# Patient Record
Sex: MALE | Race: WHITE | URBAN - METROPOLITAN AREA
[De-identification: names, ages, dates, MRNs, and addresses within clinical notes are randomized per-mention and may not be internally consistent; named-entity substitution may affect disease eponyms.]

---

## 2018-03-22 ENCOUNTER — HISTORICAL (OUTPATIENT)
Dept: ADMINISTRATIVE | Facility: HOSPITAL | Age: 38
End: 2018-03-22

## 2018-03-22 ENCOUNTER — HISTORICAL (OUTPATIENT)
Dept: LAB | Facility: HOSPITAL | Age: 38
End: 2018-03-22

## 2018-03-22 LAB
ALBUMIN SERPL-MCNC: 4.8 GM/DL (ref 3.4–5)
ALBUMIN/GLOB SERPL: 1.71 {RATIO} (ref 1.5–2.5)
ALP SERPL-CCNC: 46 UNIT/L (ref 38–126)
ALT SERPL-CCNC: 91 UNIT/L (ref 7–52)
AST SERPL-CCNC: 53 UNIT/L (ref 15–37)
BILIRUB SERPL-MCNC: 0.9 MG/DL (ref 0.2–1)
BILIRUBIN DIRECT+TOT PNL SERPL-MCNC: 0.2 MG/DL (ref 0–0.5)
BUN SERPL-MCNC: 12 MG/DL (ref 7–18)
CALCIUM SERPL-MCNC: 9.8 MG/DL (ref 8.5–10)
CHLORIDE SERPL-SCNC: 99 MMOL/L (ref 98–107)
CHOLEST SERPL-MCNC: 189 MG/DL (ref 0–200)
CHOLEST/HDLC SERPL: 6.1 {RATIO}
CO2 SERPL-SCNC: 30 MMOL/L (ref 21–32)
CREAT SERPL-MCNC: 0.8 MG/DL (ref 0.6–1.3)
CREAT/UREA NIT SERPL: 15
GGT SERPL-CCNC: 34 UNIT/L (ref 5–85)
GLOBULIN SER-MCNC: 2.8 GM/DL (ref 1.2–3)
GLUCOSE SERPL-MCNC: 87 MG/DL (ref 74–106)
HAV IGM SERPL QL IA: NEGATIVE
HBV CORE IGM SERPL QL IA: NEGATIVE
HBV SURFACE AG SERPL QL IA: NEGATIVE
HCV AB SERPL QL IA: NEGATIVE
HDLC SERPL-MCNC: 31 MG/DL (ref 35–60)
HEPATITIS PANEL INTERP: NORMAL
LDH SERPL-CCNC: 166 UNIT/L (ref 140–271)
LDLC SERPL CALC-MCNC: 139 MG/DL (ref 0–129)
POTASSIUM SERPL-SCNC: 4.5 MMOL/L (ref 3.5–5.1)
PROT SERPL-MCNC: 7.6 GM/DL (ref 6.4–8.2)
SODIUM SERPL-SCNC: 137 MMOL/L (ref 136–145)
TRIGL SERPL-MCNC: 143 MG/DL (ref 30–150)
VLDLC SERPL CALC-MCNC: 28.6 MG/DL

## 2018-07-31 ENCOUNTER — HISTORICAL (OUTPATIENT)
Dept: ADMINISTRATIVE | Facility: HOSPITAL | Age: 38
End: 2018-07-31

## 2018-09-21 ENCOUNTER — HISTORICAL (OUTPATIENT)
Dept: ADMINISTRATIVE | Facility: HOSPITAL | Age: 38
End: 2018-09-21

## 2018-09-21 LAB
ALBUMIN SERPL-MCNC: 4.6 GM/DL (ref 3.4–5)
ALBUMIN/GLOB SERPL: 1.53 {RATIO} (ref 1.5–2.5)
ALP SERPL-CCNC: 48 UNIT/L (ref 38–126)
ALT SERPL-CCNC: 30 UNIT/L (ref 7–52)
AST SERPL-CCNC: 30 UNIT/L (ref 15–37)
BILIRUB SERPL-MCNC: 0.8 MG/DL (ref 0.2–1)
BILIRUBIN DIRECT+TOT PNL SERPL-MCNC: 0.2 MG/DL (ref 0–0.5)
BILIRUBIN DIRECT+TOT PNL SERPL-MCNC: 0.6 MG/DL
BUN SERPL-MCNC: 10 MG/DL (ref 7–18)
CALCIUM SERPL-MCNC: 9.3 MG/DL (ref 8.5–10)
CHLORIDE SERPL-SCNC: 101 MMOL/L (ref 98–107)
CO2 SERPL-SCNC: 28 MMOL/L (ref 21–32)
CREAT SERPL-MCNC: 0.85 MG/DL (ref 0.6–1.3)
GLOBULIN SER-MCNC: 3 GM/DL (ref 1.2–3)
GLUCOSE SERPL-MCNC: 98 MG/DL (ref 74–106)
POTASSIUM SERPL-SCNC: 3.9 MMOL/L (ref 3.5–5.1)
PROT SERPL-MCNC: 7.6 GM/DL (ref 6.4–8.2)
SODIUM SERPL-SCNC: 136 MMOL/L (ref 136–145)

## 2019-04-08 ENCOUNTER — HISTORICAL (OUTPATIENT)
Dept: ADMINISTRATIVE | Facility: HOSPITAL | Age: 39
End: 2019-04-08

## 2019-04-08 LAB
ABS NEUT (OLG): 3.9 X10(3)/MCL (ref 2.1–9.2)
ALBUMIN SERPL-MCNC: 4.7 GM/DL (ref 3.4–5)
ALBUMIN/GLOB SERPL: 2.35 {RATIO} (ref 1.5–2.5)
ALP SERPL-CCNC: 38 UNIT/L (ref 38–126)
ALT SERPL-CCNC: 34 UNIT/L (ref 7–52)
AST SERPL-CCNC: 23 UNIT/L (ref 15–37)
BILIRUB SERPL-MCNC: 0.5 MG/DL (ref 0.2–1)
BILIRUBIN DIRECT+TOT PNL SERPL-MCNC: 0.1 MG/DL (ref 0–0.5)
BILIRUBIN DIRECT+TOT PNL SERPL-MCNC: 0.4 MG/DL
BUN SERPL-MCNC: 12 MG/DL (ref 7–18)
CALCIUM SERPL-MCNC: 9.3 MG/DL (ref 8.5–10)
CHLORIDE SERPL-SCNC: 102 MMOL/L (ref 98–107)
CHOLEST SERPL-MCNC: 183 MG/DL (ref 0–200)
CHOLEST/HDLC SERPL: 5.1 {RATIO}
CO2 SERPL-SCNC: 28 MMOL/L (ref 21–32)
CREAT SERPL-MCNC: 0.88 MG/DL (ref 0.6–1.3)
ERYTHROCYTE [DISTWIDTH] IN BLOOD BY AUTOMATED COUNT: 12.7 % (ref 11.5–17)
GLOBULIN SER-MCNC: 2 GM/DL (ref 1.2–3)
GLUCOSE SERPL-MCNC: 97 MG/DL (ref 74–106)
HCT VFR BLD AUTO: 44.9 % (ref 42–52)
HDLC SERPL-MCNC: 36 MG/DL (ref 35–60)
HGB BLD-MCNC: 15.3 GM/DL (ref 14–18)
LDLC SERPL CALC-MCNC: 118 MG/DL (ref 0–129)
LYMPHOCYTES # BLD AUTO: 2.3 X10(3)/MCL (ref 0.6–3.4)
LYMPHOCYTES NFR BLD AUTO: 34.9 % (ref 13–40)
MCH RBC QN AUTO: 29.7 PG (ref 27–31.2)
MCHC RBC AUTO-ENTMCNC: 34 GM/DL (ref 32–36)
MCV RBC AUTO: 87 FL (ref 80–94)
MONOCYTES # BLD AUTO: 0.5 X10(3)/MCL (ref 0.1–1.3)
MONOCYTES NFR BLD AUTO: 7.3 % (ref 0.1–24)
NEUTROPHILS NFR BLD AUTO: 57.8 % (ref 47–80)
PLATELET # BLD AUTO: 254 X10(3)/MCL (ref 130–400)
PMV BLD AUTO: 9.9 FL (ref 9.4–12.4)
POTASSIUM SERPL-SCNC: 4.1 MMOL/L (ref 3.5–5.1)
PROT SERPL-MCNC: 6.7 GM/DL (ref 6.4–8.2)
PSA SERPL-MCNC: 0.56 NG/ML (ref 0–2.5)
RBC # BLD AUTO: 5.16 X10(6)/MCL (ref 4.7–6.1)
SODIUM SERPL-SCNC: 137 MMOL/L (ref 136–145)
TRIGL SERPL-MCNC: 142 MG/DL (ref 30–150)
VLDLC SERPL CALC-MCNC: 28.4 MG/DL
WBC # SPEC AUTO: 6.7 X10(3)/MCL (ref 4.5–11.5)

## 2019-05-16 ENCOUNTER — HISTORICAL (OUTPATIENT)
Dept: LAB | Facility: HOSPITAL | Age: 39
End: 2019-05-16

## 2020-02-07 ENCOUNTER — HISTORICAL (OUTPATIENT)
Dept: ADMINISTRATIVE | Facility: HOSPITAL | Age: 40
End: 2020-02-07

## 2020-02-07 LAB
ABS NEUT (OLG): 9.7 X10(3)/MCL (ref 2.1–9.2)
ALBUMIN SERPL-MCNC: 4.8 GM/DL (ref 3.4–5)
ALBUMIN/GLOB SERPL: 1.78 {RATIO} (ref 1.5–2.5)
ALP SERPL-CCNC: 51 UNIT/L (ref 38–126)
ALT SERPL-CCNC: 33 UNIT/L (ref 7–52)
AST SERPL-CCNC: 21 UNIT/L (ref 15–37)
BILIRUB SERPL-MCNC: 0.5 MG/DL (ref 0.2–1)
BILIRUBIN DIRECT+TOT PNL SERPL-MCNC: 0.1 MG/DL (ref 0–0.5)
BILIRUBIN DIRECT+TOT PNL SERPL-MCNC: 0.4 MG/DL
BUN SERPL-MCNC: 11 MG/DL (ref 7–18)
CALCIUM SERPL-MCNC: 9.7 MG/DL (ref 8.5–10)
CHLORIDE SERPL-SCNC: 99 MMOL/L (ref 98–107)
CHOLEST SERPL-MCNC: 219 MG/DL (ref 0–200)
CHOLEST/HDLC SERPL: 5.5 {RATIO}
CO2 SERPL-SCNC: 30 MMOL/L (ref 21–32)
CREAT SERPL-MCNC: 0.86 MG/DL (ref 0.6–1.3)
ERYTHROCYTE [DISTWIDTH] IN BLOOD BY AUTOMATED COUNT: 12.1 % (ref 11.5–17)
GLOBULIN SER-MCNC: 2.8 GM/DL (ref 1.2–3)
GLUCOSE SERPL-MCNC: 114 MG/DL (ref 74–106)
HCT VFR BLD AUTO: 45.5 % (ref 42–52)
HDLC SERPL-MCNC: 40 MG/DL (ref 35–60)
HGB BLD-MCNC: 15.2 GM/DL (ref 14–18)
LDLC SERPL CALC-MCNC: 175 MG/DL (ref 0–129)
LYMPHOCYTES # BLD AUTO: 1.4 X10(3)/MCL (ref 0.6–3.4)
LYMPHOCYTES NFR BLD AUTO: 11.9 % (ref 13–40)
MCH RBC QN AUTO: 28.7 PG (ref 27–31.2)
MCHC RBC AUTO-ENTMCNC: 33 GM/DL (ref 32–36)
MCV RBC AUTO: 86 FL (ref 80–94)
MONOCYTES # BLD AUTO: 0.5 X10(3)/MCL (ref 0.1–1.3)
MONOCYTES NFR BLD AUTO: 4.6 % (ref 0.1–24)
NEUTROPHILS NFR BLD AUTO: 83.5 % (ref 47–80)
PLATELET # BLD AUTO: 315 X10(3)/MCL (ref 130–400)
PMV BLD AUTO: 9.2 FL (ref 9.4–12.4)
POTASSIUM SERPL-SCNC: 4.5 MMOL/L (ref 3.5–5.1)
PROT SERPL-MCNC: 7.5 GM/DL (ref 6.4–8.2)
PSA SERPL-MCNC: 0.84 NG/ML (ref 0–2.5)
RBC # BLD AUTO: 5.3 X10(6)/MCL (ref 4.7–6.1)
SODIUM SERPL-SCNC: 136 MMOL/L (ref 136–145)
TESTOST SERPL-MCNC: 259 NG/DL (ref 300–1060)
TRIGL SERPL-MCNC: 93 MG/DL (ref 30–150)
TSH SERPL-ACNC: 0.62 MIU/ML (ref 0.35–4.94)
VLDLC SERPL CALC-MCNC: 18.6 MG/DL
WBC # SPEC AUTO: 11.6 X10(3)/MCL (ref 4.5–11.5)

## 2020-03-11 ENCOUNTER — HISTORICAL (OUTPATIENT)
Dept: ADMINISTRATIVE | Facility: HOSPITAL | Age: 40
End: 2020-03-11

## 2020-03-11 LAB — TESTOST SERPL-MCNC: 471 NG/DL (ref 300–1060)

## 2020-11-04 ENCOUNTER — HISTORICAL (OUTPATIENT)
Dept: ADMINISTRATIVE | Facility: HOSPITAL | Age: 40
End: 2020-11-04

## 2020-11-04 LAB
ALBUMIN SERPL-MCNC: 4.9 GM/DL (ref 3.4–5)
ALBUMIN/GLOB SERPL: 1.75 {RATIO} (ref 1.5–2.5)
ALP SERPL-CCNC: 56 UNIT/L (ref 38–126)
ALT SERPL-CCNC: 35 UNIT/L (ref 7–52)
AST SERPL-CCNC: 29 UNIT/L (ref 15–37)
BILIRUB SERPL-MCNC: 0.8 MG/DL (ref 0.2–1)
BILIRUBIN DIRECT+TOT PNL SERPL-MCNC: 0.2 MG/DL (ref 0–0.5)
BILIRUBIN DIRECT+TOT PNL SERPL-MCNC: 0.6 MG/DL
BUN SERPL-MCNC: 11 MG/DL (ref 7–18)
CALCIUM SERPL-MCNC: 9.9 MG/DL (ref 8.5–10.1)
CHLORIDE SERPL-SCNC: 102 MMOL/L (ref 98–107)
CHOLEST SERPL-MCNC: 178 MG/DL (ref 0–200)
CHOLEST/HDLC SERPL: 5.4 {RATIO}
CO2 SERPL-SCNC: 29 MMOL/L (ref 21–32)
CREAT SERPL-MCNC: 0.96 MG/DL (ref 0.6–1.3)
GLOBULIN SER-MCNC: 2.8 GM/DL (ref 1.2–3)
GLUCOSE SERPL-MCNC: 98 MG/DL (ref 74–106)
HDLC SERPL-MCNC: 33 MG/DL (ref 35–60)
LDLC SERPL CALC-MCNC: 141 MG/DL (ref 0–129)
POTASSIUM SERPL-SCNC: 5.1 MMOL/L (ref 3.5–5.1)
PROT SERPL-MCNC: 7.7 GM/DL (ref 6.4–8.2)
SODIUM SERPL-SCNC: 139 MMOL/L (ref 136–145)
TESTOST SERPL-MCNC: 647 NG/DL (ref 300–1060)
TRIGL SERPL-MCNC: 140 MG/DL (ref 30–150)
VLDLC SERPL CALC-MCNC: 28 MG/DL

## 2022-04-10 ENCOUNTER — HISTORICAL (OUTPATIENT)
Dept: ADMINISTRATIVE | Facility: HOSPITAL | Age: 42
End: 2022-04-10

## 2022-04-26 VITALS
HEIGHT: 71 IN | SYSTOLIC BLOOD PRESSURE: 128 MMHG | OXYGEN SATURATION: 99 % | DIASTOLIC BLOOD PRESSURE: 72 MMHG | BODY MASS INDEX: 31.14 KG/M2 | WEIGHT: 222.44 LBS

## 2022-05-02 NOTE — HISTORICAL [facility]ER
This is a historical note converted from [employer]. Formatting and pictures may have been removed.  Please reference Radha for original formatting and attached multimedia. Chief Complaint  6 month recheck  History of Present Illness  losing weight with diet and exercise  had some rotator cuff trouble, saw a PT (injured the shoulder pulling ropes in his daughters play)  requests rx ambien for flight to [place]  he is doing very well  ?  Review of Systems  Comprehensive Review of Systems performed with no exceptions for new complaints other than as noted in HPI.  Physical Exam  Vitals & Measurements  HR:?76(Peripheral)? BP:?134/82?  HT:?180.34?cm? HT:?180.34?cm? WT:?98.8?kg? WT:?98.8?kg? BMI:?30.38?  ?  PHYSICAL EXAM  ?   WDWN patient in NAD, ?AFVSS  HEENT - no acute abnormality  ??????????????? oropharynx WNL  HEART - RRR  LUNGS -? CTA  ABDOMEN - benign, NTND;? no peritoneal signs  EXTREMITIES - No CCE  SKIN - warm, dry, intact  PSYCH - affect appropriate;? alert and oriented  NEURO- no new deficits noted;? cranial nerves grossly intact  ?  Assessment/Plan  1.?Hypertension  ?he will continue to work on diet/exercise  Ordered:  Clinic Follow up, *Est. 03/28/19 10:00:00 CDT, Order for future visit, Hypertension, [facility]  Office/Outpatient Visit Level 4 Established [id number] PC, Hypertension  Elevated liver enzymes  Dyslipidemia  Obesity, [facility] - [facility], 09/21/18 11:25:00 CDT  ?  2.?Obesity  losing weight with diet/exercise  Ordered:  Office/Outpatient Visit Level 4 Established [id number] PC, Hypertension  Elevated liver enzymes  Dyslipidemia  Obesity, [facility] - [facility], 09/21/18 11:25:00 CDT  ?  3.?Elevated liver enzymes  will moniter  Ordered:  Comprehensive Metabolic Panel, Routine collect, 09/21/18 11:28:00 CDT, Blood, Stop date 09/21/18 11:28:00 CDT, Lab Collect, Elevated liver enzymes, 09/21/18 11:28:00 CDT  Office/Outpatient Visit Level 4 Established [id number] PC, Hypertension  Elevated liver enzymes  Dyslipidemia   Obesity, [facility] - [facility], 09/21/18 11:25:00 CDT  ?  4.?Dyslipidemia  continue diet/exercise  Ordered:  Office/Outpatient Visit Level 4 Established [id number] PC, Hypertension  Elevated liver enzymes  Dyslipidemia  Obesity, [facility] - [facility], 09/21/18 11:25:00 CDT  ?  Encounter for vaccination  ?  Orders:  hydrochlorothiazide-lisinopril, 2 tab(s), Oral, Daily, # 180 tab(s), 1 Refill(s), Pharmacy: [facility] - [place], [place]  zolpidem, 10 mg = 1 tab(s), Oral, Once a day (at bedtime), PRN PRN for sleep, # 20 tab(s), 0 Refill(s), Pharmacy: [facility] - [place], [place]  risks/benefits/alternatives/side effects to ambien discussed   Problem List/Past Medical History  Ongoing  Depression  Dyslipidemia  Epididymis  Hydrocele  Hypertension  Medication management  Obesity  VMR - Vasomotor rhinitis  Historical  No qualifying data  Procedure/Surgical History  Lumbar discectomy  Repair of nasal sinus   Medications  Ambien 10 mg oral tablet, 10 mg= 1 tab(s), Oral, Once a day (at bedtime), PRN  hydrochlorothiazide-lisinopril 12.5 mg-20 mg oral tablet, 2 tab(s), Oral, Daily, 1 refills  Allergies  No Known Allergies  Social History  Alcohol  Current, 03/22/2018  Employment/School  Employed, 03/22/2018  Home/Environment  Lives with Alone. Living situation: Home/Independent., 03/22/2018  Substance Abuse  Never, 03/22/2018  Tobacco  Never smoker, 03/22/2018  Family History  Hypertension.: Father.  Immunizations  Vaccine Date Status   influenza virus vaccine, inactivated 09/21/2018 Given   tetanus/diphth/pertuss (Tdap) adult/adol 12/08/2016 Recorded   Health Maintenance  Health Maintenance  ???Pending?(in the next year)  ??? ??Due?  ??? ? ? ?ADL Screening due??09/21/18??and every 1??year(s)  ??? ? ? ?Alcohol Misuse Screening due??09/21/18??and every 1??year(s)  ??? ? ? ?Depression Screening due??09/21/18??and every?  ??? ? ? ?Diabetes Screening due??09/21/18??and every?  ??? ? ? ?Hypertension Maintenance-Medication Prescribed  due??09/21/18??and every 1??year(s)  ??? ? ? ?Hypertension Management-Education due??09/21/18??and every 1??year(s)  ??? ? ? ?Influenza Vaccine due??09/21/18??and every?  ??? ? ? ?Smoking Cessation due??09/21/18??Variable frequency  ???Satisfied?(in the past 1 year)  ??? ??Satisfied?  ??? ? ? ?Blood Pressure Screening on??09/21/18.??Satisfied by Bayron Curry MD  ??? ? ? ?Body Mass Index Check on??09/21/18.??Satisfied by Norma Gabriel  ??? ? ? ?Diabetes Screening on??09/21/18.??Satisfied by Bayron Curry MD  ??? ? ? ?Hypertension Management-Blood Pressure on??09/21/18.??Satisfied by Bayron Curry MD  ??? ? ? ?Influenza Vaccine on??09/21/18.??Satisfied by Norma Gabriel  ??? ? ? ?Lipid Screening on??03/22/18.??Satisfied by Irvin Lebron  ??? ? ? ?Obesity Screening on??09/21/18.??Satisfied by Norma Gabriel  ??? ??Refused?  ??? ? ? ?Influenza Vaccine on??03/22/18.??Recorded for Starr Barrera NP??Reason: Patient Refuses  ?  ?

## 2022-05-02 NOTE — HISTORICAL [facility]
This is a historical note converted from [employer]. Formatting and pictures may have been removed.  Please reference [employer] for original formatting and attached multimedia. Chief Complaint  cpx  History of Present Illness  The patient is a [37] year old [male] here today for a complete Wellness Physical exam. The patient has [no] acute complaints today. The patient also carries a diagnosis of [HTN, Dyslipidemia, which is assessed today. Exercise is reported as [3-4 times per week] and includes [cardio and weights]. Monitors diet on a daily basis. Previous documented weight at last office visit is [241].?  ?   Just rain a 6mile marathon last weekend- plans to run a larger race next year, has been training- exercising and really has been monitoring diet- has cut out all breads, and rice as well as ETOH. Overall feeling well.  ?   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  Review of Systems  Constitutional:?no weight gain,?weight loss,- exercising and monitoring diet?no fatigue,?no fever,?no chills,?no weakness,?no trouble sleeping.  Eyes:?no vision loss/changes,?glasses or contacts,?no pain,?no redness,?no blurry or double vision,?no flashing lights,?no specks,?no glaucoma,?no cataracts.  Last eye exam:?within last 6 months  Head:?no headache,?no head injury,?no neck pain.?  Neck:??no lumps,?no swollen glands,?no stiffness.  Ears:?no decreased hearing,?no ringing,?no earache,?no drainage.?  Nose:?no stuffiness,?no discharge,?no itching,?no hay fever,?no nosebleeds,?no sinus pain.  Throat:?no bleeding,?no dentures,?no sore tongue,?no dry mouth,?no sore throat,?no hoarseness,?no thrush,?no non-healing sores.  Cardiovascular:?no chest pain or discomfort,?no tightness,?no palpitations,?no SOB with activity,?no difficulty breathing while supine,?no swelling,?no sudden awakening from sleep with SOB.  Vascular:?no calf pain with walking,?no leg cramping.  Respiratory:??no cough,?no sputum,?no  coughing up blood,?no SOB,?no wheezing,?no painful breathing.  Gastrointestinal:?no swallowing difficulty,?no heartburn,?no change in appetite,?no nausea,?no change in bowel habits,?no rectal bleeding,?no constipation,?no diarrhea,?no yellow eyes or skin.  Urinary:?no frequency,?no urgency,?no burning or pain,?no blood in urine,?no incontinence,?no change in urinary strength.  Musculoskeletal:?no muscle or joint pain,?no stiffness,?no back pain,?no redness of joints,?no swelling of joints,?no trauma.  Skin:?no rashes,?no lumps,?no itching,?no dryness,?color normal for ethnicity,?no hair or nail changes.  Neurologic:?no dizziness,?no fainting,?no seizures,?no weakness,?no numbness,?no tingling,?no tremors.  Psychiatric:?no nervousness,?no stress,?no depression,?no memory loss.  Endocrine:?no heat or cold intolerance,?no sweating,?no frequent urination,?no thirst,?no change in appetite.  Hematologic:?no ease of bruising,?no ease of bleeding.  ?  ?  ?  Physical Exam  Vitals & Measurements  HR:?88(Peripheral)? BP:?122/70?  HT:?180.34?cm? HT:?180.34?cm? WT:?110.67?kg? WT:?110.67?kg? BMI:?34.03?  General- In NAD, A&O x 4  ?   Eye- PERRL, EOMI, Fundi with sharp disc borders  ?   HENT- TM/EAC clear, Nose mucosa WNL, No D/C, No Sinus Tenderness, O/P without erythema or exudates?  ?   Neck- S, No LA, No Thyromegaly, No bruits, No JVD  ?   Respiratory- CTA, No wheezing, No crackles, No rhonchi  ?   Cardiovascular- RRR W/O MGR, Pulses equal throughout  ?   Gastrointestinal- S, NT, No HSM, NABS, No masses, No peritoneal signs?  ?   Lymphatics- WNL  ?  Musculoskeletal- No tenderness, Joints WNL, FROM, Neg SLR, No CCE  ?  Integumentary- Warm, dry, intact, No lesions/rashes/hives  ?  Neurologic- No Motor/Sensory deficits, Reflexes +2 throughout, CN II-XII intact, Neg cerebellar tests  ?  ?  Assessment/Plan  1.?Dyslipidemia  1. Continue diet and exercise for weight loss as discussed  ?  Ordered:  Clinic Follow up, *Est. 09/22/18  3:00:00 CDT, Order for future visit, Wellness examination  Obesity  Dyslipidemia  Hypertension, [facility]  CMP, Routine collect, 03/22/18 14:23:00 CDT, Blood, Order for future visit, Stop date 03/22/18 14:23:00 CDT, Lab Collect, Wellness examination  Hypertension  Obesity  Dyslipidemia, 03/22/18 14:23:00 CDT  Lab Collection Request, 03/22/18 14:23:00 CDT, [facility] - [facility], 03/22/18 14:23:00 CDT  Lipid Panel, Routine collect, 03/22/18 14:23:00 CDT, Blood, Order for future visit, Stop date 03/22/18 14:23:00 CDT, Lab Collect, Wellness examination  Dyslipidemia  Hypertension  Obesity, 03/22/18 14:23:00 CDT  Pre[facility]ative [facility] [place] 18-39 [facility] [id number] PC, Wellness examination  Hypertension  Dyslipidemia  Obesity, [facility] AMB - [facility], 03/22/18 14:23:00 CDT  ?  2.?Hypertension  1. Refill meds x 6 months  2. F/U ov in 6 months for CPX  Ordered:  Clinic Follow up, *Est. 09/22/18 3:00:00 CDT, Order for future visit, Wellness examination  Obesity  Dyslipidemia  Hypertension, [facility]  CMP, Routine collect, 03/22/18 14:23:00 CDT, Blood, Order for future visit, Stop date 03/22/18 14:23:00 CDT, Lab Collect, Wellness examination  Hypertension  Obesity  Dyslipidemia, 03/22/18 14:23:00 CDT  Lab Collection Request, 03/22/18 14:23:00 CDT, [facility] - [facility], 03/22/18 14:23:00 CDT  Lipid Panel, Routine collect, 03/22/18 14:23:00 CDT, Blood, Order for future visit, Stop date 03/22/18 14:23:00 CDT, Lab Collect, Wellness examination  Dyslipidemia  Hypertension  Obesity, 03/22/18 14:23:00 CDT  [facility] [place] 18-39 years [id number] PC, Wellness examination  Hypertension  Dyslipidemia  Obesity, [facility] AMB - [facility], 03/22/18 14:23:00 CDT  ?  3.?Obesity  1. Continue exercise for weight loss  Ordered:  Clinic Follow up, *Est. 09/22/18 3:00:00 CDT, Order for future visit, Wellness examination  Obesity  Dyslipidemia  Hypertension, [facility]  CMP, Routine collect, 03/22/18 14:23:00 CDT, Blood, Order for future visit,  Stop date 03/22/18 14:23:00 CDT, Lab Collect, Wellness examination  Hypertension  Obesity  Dyslipidemia, 03/22/18 14:23:00 CDT  Lab Collection Request, 03/22/18 14:23:00 CDT, [facility] AMB - [facility], 03/22/18 14:23:00 CDT  Lipid Panel, Routine collect, 03/22/18 14:23:00 CDT, Blood, Order for future visit, Stop date 03/22/18 14:23:00 CDT, Lab Collect, Wellness examination  Dyslipidemia  Hypertension  Obesity, 03/22/18 14:23:00 CDT  Pre[facility]ative Health [facility] 18-39 years [id number] PC, Wellness examination  Hypertension  Dyslipidemia  Obesity, [facility] AMB - [facility], 03/22/18 14:23:00 CDT  ?  Wellness examination  1. CMP, LIPIDS  Ordered:  Clinic Follow up, *Est. 09/22/18 3:00:00 CDT, Order for future visit, Wellness examination  Obesity  Dyslipidemia  Hypertension, [facility]  CMP, Routine collect, 03/22/18 14:23:00 CDT, Blood, Order for future visit, Stop date 03/22/18 14:23:00 CDT, Lab Collect, Wellness examination  Hypertension  Obesity  Dyslipidemia, 03/22/18 14:23:00 CDT  Lab Collection Request, 03/22/18 14:23:00 CDT, [facility] AMB - [facility], 03/22/18 14:23:00 CDT  Lipid Panel, Routine collect, 03/22/18 14:23:00 CDT, Blood, Order for future visit, Stop date 03/22/18 14:23:00 CDT, Lab Collect, Wellness examination  Dyslipidemia  Hypertension  Obesity, 03/22/18 14:23:00 CDT  [facility] 18-39 years [id number] PC, Wellness examination  Hypertension  Dyslipidemia  Obesity, [facility] AMB - AFP, 03/22/18 14:23:00 CDT  ?  Orders:  hydrochlorothiazide-lisinopril, 2 tab(s), Oral, Daily, # 60 tab(s), 5 Refill(s), Pharmacy: [facility] [place]  Clinic Follow-up PRN, 03/22/18 14:23:00 CDT, [facility] AMB - AFP, Future Order   Problem List/Past Medical History  Ongoing  Depression  Dyslipidemia  Epididymis  Hydrocele  Hypertension  Medication management  Obesity  VMR - Vasomotor rhinitis  Historical  No qualifying data  Procedure/Surgical History  Lumbar discectomy, Repair of nasal  sinus.  Medications  hydrochlorothiazide-lisinopril 12.5 mg-20 mg oral tablet, 2 tab(s), Oral, Daily, 5 refills  Allergies  No Known Medication Allergies  Social History  Alcohol  Current, 03/22/2018  Employment/School  Employed, 03/22/2018  Home/Environment  Lives with Alone. Living situation: Home/Independent., 03/22/2018  Substance Abuse  Never, 03/22/2018  Tobacco  Never smoker, 03/22/2018  Family History  Hypertension.: Mother.